# Patient Record
Sex: FEMALE | Race: OTHER | HISPANIC OR LATINO | Employment: FULL TIME | URBAN - METROPOLITAN AREA
[De-identification: names, ages, dates, MRNs, and addresses within clinical notes are randomized per-mention and may not be internally consistent; named-entity substitution may affect disease eponyms.]

---

## 2023-10-05 ENCOUNTER — APPOINTMENT (EMERGENCY)
Dept: RADIOLOGY | Facility: HOSPITAL | Age: 18
End: 2023-10-05

## 2023-10-05 ENCOUNTER — HOSPITAL ENCOUNTER (EMERGENCY)
Facility: HOSPITAL | Age: 18
Discharge: HOME | End: 2023-10-06
Attending: EMERGENCY MEDICINE

## 2023-10-05 VITALS
HEART RATE: 94 BPM | DIASTOLIC BLOOD PRESSURE: 84 MMHG | OXYGEN SATURATION: 98 % | TEMPERATURE: 98.6 F | SYSTOLIC BLOOD PRESSURE: 156 MMHG | WEIGHT: 198.85 LBS | RESPIRATION RATE: 18 BRPM

## 2023-10-05 DIAGNOSIS — S51.811A LACERATION OF RIGHT FOREARM, INITIAL ENCOUNTER: ICD-10-CM
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
## 2023-10-05 LAB
EXT PREGNANCY TEST URINE: NEGATIVE
EXT. CONTROL: NORMAL

## 2023-10-05 PROCEDURE — 99284 EMERGENCY DEPT VISIT MOD MDM: CPT

## 2023-10-05 PROCEDURE — 99283 EMERGENCY DEPT VISIT LOW MDM: CPT

## 2023-10-05 PROCEDURE — 73090 X-RAY EXAM OF FOREARM: CPT

## 2023-10-05 PROCEDURE — 81025 URINE PREGNANCY TEST: CPT

## 2023-10-05 PROCEDURE — 96372 THER/PROPH/DIAG INJ SC/IM: CPT

## 2023-10-05 PROCEDURE — 73630 X-RAY EXAM OF FOOT: CPT

## 2023-10-05 PROCEDURE — 12002 RPR S/N/AX/GEN/TRNK2.6-7.5CM: CPT

## 2023-10-05 RX ORDER — METRONIDAZOLE 500 MG/1
500 TABLET ORAL ONCE
Status: COMPLETED | OUTPATIENT
Start: 2023-10-05 | End: 2023-10-06

## 2023-10-05 RX ORDER — KETOROLAC TROMETHAMINE 30 MG/ML
15 INJECTION, SOLUTION INTRAMUSCULAR; INTRAVENOUS ONCE
Status: COMPLETED | OUTPATIENT
Start: 2023-10-05 | End: 2023-10-05

## 2023-10-05 RX ORDER — CEFUROXIME AXETIL 250 MG/1
500 TABLET ORAL EVERY 12 HOURS SCHEDULED
Status: DISCONTINUED | OUTPATIENT
Start: 2023-10-05 | End: 2023-10-06 | Stop reason: HOSPADM

## 2023-10-05 RX ORDER — ACETAMINOPHEN 325 MG/1
975 TABLET ORAL ONCE
Status: DISCONTINUED | OUTPATIENT
Start: 2023-10-05 | End: 2023-10-06 | Stop reason: HOSPADM

## 2023-10-05 RX ORDER — METRONIDAZOLE 500 MG/1
500 TABLET ORAL EVERY 8 HOURS SCHEDULED
Qty: 30 TABLET | Refills: 0 | Status: SHIPPED | OUTPATIENT
Start: 2023-10-05 | End: 2023-10-15

## 2023-10-05 RX ORDER — LIDOCAINE HYDROCHLORIDE 10 MG/ML
20 INJECTION, SOLUTION EPIDURAL; INFILTRATION; INTRACAUDAL; PERINEURAL ONCE
Status: COMPLETED | OUTPATIENT
Start: 2023-10-05 | End: 2023-10-05

## 2023-10-05 RX ORDER — CEFUROXIME AXETIL 500 MG/1
500 TABLET ORAL EVERY 12 HOURS SCHEDULED
Qty: 20 TABLET | Refills: 0 | Status: SHIPPED | OUTPATIENT
Start: 2023-10-05 | End: 2023-10-15

## 2023-10-05 RX ADMIN — KETOROLAC TROMETHAMINE 15 MG: 30 INJECTION, SOLUTION INTRAMUSCULAR; INTRAVENOUS at 22:17

## 2023-10-05 RX ADMIN — LIDOCAINE HYDROCHLORIDE 20 ML: 10 INJECTION, SOLUTION EPIDURAL; INFILTRATION; INTRACAUDAL at 22:19

## 2023-10-05 NOTE — Clinical Note
Germania Edmondson was seen and treated in our emergency department on 10/5/2023. Diagnosis:     Carroll Barahona  may return to work on return date. She may return on this date: 10/10/2023         If you have any questions or concerns, please don't hesitate to call.       Kathy Nance PA-C    ______________________________           _______________          _______________  Hospital Representative                              Date                                Time

## 2023-10-06 RX ADMIN — METRONIDAZOLE 500 MG: 500 TABLET ORAL at 00:01

## 2023-10-06 RX ADMIN — CEFUROXIME AXETIL 500 MG: 250 TABLET, FILM COATED ORAL at 00:01

## 2023-10-06 NOTE — DISCHARGE INSTRUCTIONS
Please return to the emergency department for any concerns as outlined in the after visit summary or for any other concerns. Please use the contact number provided for assistance in finding a primary care provider, follow-up as soon as reasonably possible. Sutures need to come out in 7-10 days. Continue to keep the wounds clean and dry. Change the dressings 2 times a day and also dressing becomes dirty then change more often. Yellow material gets thrown away after the first bandage change. Complete the full course of antibiotics as prescribed. Can use bacitracin, Neosporin or similar over-the-counter medications over the wounds until they are healed. Continue ibuprofen or acetaminophen as needed for pain control.

## 2023-10-06 NOTE — ED PROVIDER NOTES
History  Chief Complaint   Patient presents with   • Dog Bite     Pt reports she was feeding her dog and it bit her. Wounds noted to L foot and R arm. States her dog is vaccinated. Unsure of tetanus status. 53-year-old female with no reported past medical history presented to the ED with a complaint of a dog bite to her right forearm and left foot. Patient reports her 3year-old dog was eating something, patient went to grab the food and the dog bit her. Reports the dog bit her once in the right forearm and once in the left foot. Reports it was mild bleeding initially however no bleeding at this time. Says she does have pain but no pain medications PTA. Reports prior to this incident she is otherwise been feeling well. No other concerns of injury or trauma. Reports dog is up-to-date on his vaccines including rabies. Patient certain she received a tetanus vaccine within the last 5 years. No numbness, tingling or weakness in the RUE or left foot. No other complaints today. Does report a remote history of penicillin allergy which she believes she had difficult time breathing when she took it. No other antibiotic allergies or recent antibiotics. None       History reviewed. No pertinent past medical history. History reviewed. No pertinent surgical history. History reviewed. No pertinent family history. I have reviewed and agree with the history as documented. E-Cigarette/Vaping     E-Cigarette/Vaping Substances     Social History     Tobacco Use   • Smoking status: Never   • Smokeless tobacco: Never   Substance Use Topics   • Alcohol use: Not Currently   • Drug use: Never       Review of Systems   Skin: Positive for wound (Right forearm and left foot). All other systems reviewed and are negative. Physical Exam  Physical Exam  Vitals and nursing note reviewed. Constitutional:       General: She is not in acute distress. Appearance: She is well-developed.    HENT: Head: Normocephalic and atraumatic. Eyes:      Conjunctiva/sclera: Conjunctivae normal.   Cardiovascular:      Rate and Rhythm: Normal rate and regular rhythm. Heart sounds: No murmur heard. Pulmonary:      Effort: Pulmonary effort is normal. No respiratory distress. Breath sounds: Normal breath sounds. Abdominal:      Palpations: Abdomen is soft. Tenderness: There is no abdominal tenderness. Musculoskeletal:         General: No swelling. Cervical back: Neck supple. Comments: Multiple small puncture wounds over the dorsal aspect of the mid right forearm. Also has a few mild abrasions over the right forearm. There is also an approximate 3 cm, gaping wound over the volar aspect of the right forearm. No exposed bone, muscle, tendons, ligaments or vasculature. No active bleeding. No other TTP or signs of injury over the R UE. Full, PROM intact in the right shoulder, right elbow, right wrist and right digits. 2+ right radial pulse. Sensation and motor intact in the RUE. Strength 5/5 in RUE. Patient also has 2 small puncture wounds over the plantar aspect of the left foot. Nonbleeding. No lacerations. No bony TTP. Skin:     General: Skin is warm and dry. Capillary Refill: Capillary refill takes less than 2 seconds. Neurological:      Mental Status: She is alert.    Psychiatric:         Mood and Affect: Mood normal.                       Vital Signs  ED Triage Vitals [10/05/23 2017]   Temperature Pulse Respirations Blood Pressure SpO2   98.6 °F (37 °C) 94 18 156/84 98 %      Temp Source Heart Rate Source Patient Position - Orthostatic VS BP Location FiO2 (%)   Oral -- -- -- --      Pain Score       10 - Worst Possible Pain           Vitals:    10/05/23 2017   BP: 156/84   Pulse: 94         Visual Acuity      ED Medications  Medications   acetaminophen (TYLENOL) tablet 975 mg (975 mg Oral Not Given 10/5/23 2217)   cefuroxime (CEFTIN) tablet 500 mg (500 mg Oral Given 10/6/23 0001)   lidocaine (PF) (XYLOCAINE-MPF) 1 % injection 20 mL (20 mL Infiltration Given by Other 10/5/23 2219)   ketorolac (TORADOL) injection 15 mg (15 mg Intramuscular Given 10/5/23 2217)   metroNIDAZOLE (FLAGYL) tablet 500 mg (500 mg Oral Given 10/6/23 0001)       Diagnostic Studies  Results Reviewed     Procedure Component Value Units Date/Time    POCT pregnancy, urine [[id number]]  (Normal) Resulted: 10/05/23 2236    Lab Status: Final result Updated: 10/05/23 2236     EXT Preg Test, Ur Negative     Control Valid                 XR foot 3+ views LEFT   ED Interpretation by Nima Cotter PA-C (10/05 2230)   ED wet read:  No radiopaque retained foreign body or acute osseous abnormality appreciated. XR forearm 2 views RIGHT   ED Interpretation by Nima Cotter PA-C (10/05 2230)   ED wet read:  No radiopaque retained foreign body or acute osseous abnormality appreciated. Soft tissue findings consistent with puncture wounds/lacerations. Procedures  Universal Protocol:  Consent: Verbal consent obtained. Risks and benefits: risks, benefits and alternatives were discussed  Consent given by: patient  Patient understanding: patient states understanding of the procedure being performed    Laceration repair    Date/Time: 10/5/2023 11:29 PM    Performed by: Nima Cotter PA-C  Authorized by: Nima Cotter PA-C  Body area: upper extremity  Location details: right lower arm  Laceration length: 3 cm  Foreign bodies: no foreign bodies  Tendon involvement: none  Nerve involvement: none  Vascular damage: no  Anesthesia: local infiltration    Anesthesia:  Local Anesthetic: lidocaine 1% without epinephrine  Anesthetic total: 5 mL      Procedure Details:  Preparation: Patient was prepped and draped in the usual sterile fashion.   Irrigation solution: saline  Irrigation method: syringe  Amount of cleaning: extensive  Debridement: none  Skin closure: 4-0 nylon  Number of sutures: 1  Technique: simple  Approximation: loose  Approximation difficulty: simple  Dressing: xeroform, non-stick gauze and gauze wrap. Patient tolerance: patient tolerated the procedure well with no immediate complications    Universal Protocol:  Consent: Verbal consent obtained. Risks and benefits: risks, benefits and alternatives were discussed  Consent given by: patient  Patient understanding: patient states understanding of the procedure being performed    Laceration repair    Date/Time: 10/5/2023 11:31 PM    Performed by: Barrett Graves PA-C  Authorized by: Barrett Graves PA-C  Body area: upper extremity  Location details: right lower arm  Wound length (cm): Multiple puncture wounds   Foreign bodies: no foreign bodies  Tendon involvement: none  Nerve involvement: none  Vascular damage: no  Anesthesia: local infiltration    Anesthesia:  Local Anesthetic: lidocaine 1% without epinephrine  Anesthetic total: 3 mL    Sedation:  Patient sedated: no        Procedure Details:  Preparation: Patient was prepped and draped in the usual sterile fashion. Irrigation solution: saline  Irrigation method: syringe  Amount of cleaning: extensive  Debridement: none  Wound skin closure material used: None   Dressing: Xeroform, non-stick gauze and gauze wrap   Patient tolerance: patient tolerated the procedure well with no immediate complications  Comments: Wounds left open in light of dog bite. Nongaping. Hemostasis achieved. Universal Protocol:  Consent: Verbal consent obtained. Risks and benefits: risks, benefits and alternatives were discussed  Consent given by: patient  Patient understanding: patient states understanding of the procedure being performed    Laceration repair    Date/Time: 10/5/2023 11:32 PM    Performed by: Barrett Graves PA-C  Authorized by: Barrett Graves PA-C  Body area: lower extremity  Location details: left foot  Wound length (cm): Puncture wound.   Foreign bodies: no foreign bodies  Tendon involvement: none  Nerve involvement: none  Vascular damage: no  Anesthesia: local infiltration    Anesthesia:  Local Anesthetic: lidocaine 1% without epinephrine  Anesthetic total: 1 mL      Procedure Details:  Preparation: Patient was prepped and draped in the usual sterile fashion. Irrigation solution: saline  Irrigation method: syringe  Amount of cleaning: extensive  Debridement: none  Dressing: Xeroform, non-stick gauze and gauze wrap   Patient tolerance: patient tolerated the procedure well with no immediate complications               ED Course  ED Course as of 10/06/23 0200   u Oct 05, 2023   2230 No radiopaque retained foreign body or acute osseous abnormality appreciated on XR imaging pending official reads. 2325 Laceration repaired without issue, loosely approximated in light of wound gaping. Hemostasis achieved. All other wounds thoroughly irrigated. Xeroform and nonstick gauze applied with gauze wrap. Patient given 1 dose of antibiotics in the ED. Prescription sent to her pharmacy. Verbally discussed increased risk of infection in light of dog bite and wounds. Follow-up and supportive care as outlined in the AVS.  Patient reports she feels well to go home. Strict return precautions verbally communicated to the patient as outlined in the AVS.  All patient questions and concerns were answered. Patient verbally communicated their understanding and agreement to the above plan. Patient stable at discharge. Portions of the record may have been created with voice recognition software. Occasional wrong word or "sound a like" substitutions may have occurred due to the inherent limitations of voice recognition software. Read the chart carefully and recognize, using context, where substitutions have occurred.                                                Medical Decision Making  78-year-old female presents ED with concern for a dog bite from her own pet shortly prior to arrival.  Has multiple wounds over the right form and left foot. Has otherwise been well. Dog is up-to-date on his vaccines. Patient is up-to-date on her tetanus. Does report a history of penicillin allergy. PE findings as outlined in the PE section. Laceration over the right forearm will likely require loose approximation as it is gaping and fairly large. Other wounds we will plan to thoroughly irrigate. We will start patient on antibiotics. Will defer Augmentin as patient with penicillin allergy. Will prescribe cefuroxime and metronidazole. Will obtain XR imaging of the right forearm and left foot to ensure no signs of radiopaque retained foreign body or osseous abnormality. No other complaints from pt or findings on PE to warrant further labs, imaging or work-up at this time. Patient agreeable to this plan. Likely discharge however will reevaluate after above-stated plan. Dog bite of multiple sites: acute illness or injury  Laceration of right forearm, initial encounter: acute illness or injury  Amount and/or Complexity of Data Reviewed  Labs: ordered. Radiology: ordered and independent interpretation performed. Risk  OTC drugs. Prescription drug management. Disposition  Final diagnoses:   Dog bite of multiple sites   Laceration of right forearm, initial encounter     Time reflects when diagnosis was documented in both MDM as applicable and the Disposition within this note     Time User Action Codes Description Comment    10/5/2023 11:22 PM Amanda Falcon Add Brette.Perish. 0XXA] Dog bite of multiple sites     10/5/2023 11:23 PM Amanda Falcon Add [L95.560T] Laceration of right forearm, initial encounter       ED Disposition     ED Disposition   Discharge    Condition   Stable    Date/Time   Thu Oct 5, 2023 11:22 PM    Comment   Jung Ayon discharge to home/self care.                Follow-up Information     Follow up With Specialties Details Why Contact Info Additional [facility] Emergency Department Emergency Medicine Go to  As needed, If symptoms worsen [address] [phone]  [address] [facility] Emergency Department, [address] [facility] [place], [address], [address]  Call in 1 day For a primary care provider, For further evaluation [phone]             Discharge Medication List as of 10/5/2023 11:29 PM      START taking these medications    Details   cefuroxime (CEFTIN) 500 mg tablet Take 1 tablet (500 mg total) by mouth every 12 (twelve) hours for 10 days, Starting Thu 10/5/2023, Until Sun 10/15/2023, Normal      metroNIDAZOLE (FLAGYL) 500 mg tablet Take 1 tablet (500 mg total) by mouth every 8 (eight) hours for 10 days, Starting Thu 10/5/2023, Until Sun 10/15/2023, Normal             No discharge procedures on file.     PDMP Review     None          ED Provider  Electronically Signed by           Justine Mathew PA-C  10/06/23 6625

## 2025-07-10 ENCOUNTER — ULTRASOUND (OUTPATIENT)
Dept: OBGYN CLINIC | Facility: CLINIC | Age: 20
End: 2025-07-10

## 2025-07-10 VITALS
WEIGHT: 162.6 LBS | SYSTOLIC BLOOD PRESSURE: 132 MMHG | HEIGHT: 65 IN | DIASTOLIC BLOOD PRESSURE: 82 MMHG | BODY MASS INDEX: 27.09 KG/M2

## 2025-07-10 DIAGNOSIS — Z34.01 PRENATAL CARE, FIRST PREGNANCY IN FIRST TRIMESTER: ICD-10-CM

## 2025-07-10 DIAGNOSIS — Z32.01 ENCOUNTER FOR PREGNANCY TEST, RESULT POSITIVE: Primary | ICD-10-CM

## 2025-07-10 DIAGNOSIS — Z3A.08 8 WEEKS GESTATION OF PREGNANCY: ICD-10-CM

## 2025-07-10 LAB — SL AMB POCT URINE HCG: POSITIVE

## 2025-07-10 PROCEDURE — 81025 URINE PREGNANCY TEST: CPT | Performed by: OBSTETRICS & GYNECOLOGY

## 2025-07-10 PROCEDURE — 76801 OB US < 14 WKS SINGLE FETUS: CPT | Performed by: OBSTETRICS & GYNECOLOGY

## 2025-07-10 PROCEDURE — 99203 OFFICE O/P NEW LOW 30 MIN: CPT | Performed by: OBSTETRICS & GYNECOLOGY

## 2025-07-10 NOTE — PROGRESS NOTES
"Name: Meet Marquez      : 2005      MRN: [id number]  Encounter Provider: Daniel Villela MD  Encounter Date: 7/10/2025   Encounter department: [facility]  :  Assessment & Plan  Encounter for pregnancy test, result positive  Pregnancy confirmed with POCT hgb, and viable IUP noted  Orders:    POCT urine HCG    AMB US OB < 14 weeks single or first gestation level 1    8 weeks gestation of pregnancy    Orders:    HIV 1/2 AG/AB w Reflex [facility] for 2 yr old and above; Future    Hepatitis C antibody; Future    UA (URINE) with reflex to Scope; Future    Urine culture; Future    Hepatitis B surface antigen; Future    CBC and differential; Future    Rubella antibody, IgG; Future    Type and screen; Future    RPR-Syphilis Screening (Total Syphilis IGG/IGM); Future    Hepatitis B surface antibody; Future    Anemia Panel w/Reflex, OB; Future    Prenatal care, first pregnancy in first trimester    Orders:    HIV 1/2 AG/AB w Reflex [facility] for 2 yr old and above; Future    Hepatitis C antibody; Future    UA (URINE) with reflex to Scope; Future    Urine culture; Future    Hepatitis B surface antigen; Future    CBC and differential; Future    Rubella antibody, IgG; Future    Type and screen; Future    RPR-Syphilis Screening (Total Syphilis IGG/IGM); Future    Hepatitis B surface antibody; Future    Anemia Panel w/Reflex, OB; Future        History of Present Illness   HPI  S: 19 y.o.  who presents for viability scan. Her LMP was 5/15/2025 and she is 8 weeks and 0 days by her LMP. This pregnancy was unplanned, but desirable. She reports mild and infrequent cramping. She reports having nausea, but no vomiting. Medical problems include: none. Current medications: none. Smoking/drinking/illicit drug use: denies.     Review of Systems  See HPI     Objective   /82 (BP Location: Left arm, Patient Position: Sitting)   Ht 5' 4.72\" (1.644 m)   Wt 73.8 kg (162 lb 9.6 oz)   LMP " 05/15/2025 (Exact Date)   BMI 27.29 kg/m²      Physical Exam  See media tab for U/S imaging                  Daniel Villela MD  Obstetrics & Gynecology PGY-2  7/10/2025  3:06 PM

## 2025-07-10 NOTE — PROGRESS NOTES
Ultrasound Probe Disinfection    A transvaginal ultrasound was performed.   Prior to use, disinfection was performed with High Level Disinfection Process ([employer])  Probe serial number [id number] was used.    Shanda Hart MA  07/10/25  2:54 PM

## 2025-07-10 NOTE — ASSESSMENT & PLAN NOTE
Pregnancy confirmed with POCT hgb, and viable IUP noted  Orders:    POCT urine HCG    AMB US OB < 14 weeks single or first gestation level 1

## 2025-07-10 NOTE — ASSESSMENT & PLAN NOTE
Orders:    HIV 1/2 AG/AB w Reflex [facility] for 2 yr old and above; Future    Hepatitis C antibody; Future    UA (URINE) with reflex to Scope; Future    Urine culture; Future    Hepatitis B surface antigen; Future    CBC and differential; Future    Rubella antibody, IgG; Future    Type and screen; Future    RPR-Syphilis Screening (Total Syphilis IGG/IGM); Future    Hepatitis B surface antibody; Future    Anemia Panel w/Reflex, OB; Future

## 2025-07-21 ENCOUNTER — APPOINTMENT (OUTPATIENT)
Dept: LAB | Facility: HOSPITAL | Age: 20
End: 2025-07-21
Payer: MEDICARE

## 2025-07-21 DIAGNOSIS — Z3A.08 8 WEEKS GESTATION OF PREGNANCY: ICD-10-CM

## 2025-07-21 DIAGNOSIS — Z34.01 PRENATAL CARE, FIRST PREGNANCY IN FIRST TRIMESTER: ICD-10-CM

## 2025-07-21 LAB
ABO GROUP BLD: NORMAL
BACTERIA UR QL AUTO: ABNORMAL /HPF
BASOPHILS # BLD AUTO: 0.07 THOUSANDS/ÂΜL (ref 0–0.1)
BASOPHILS NFR BLD AUTO: 0 % (ref 0–1)
BILIRUB UR QL STRIP: NEGATIVE
BLD GP AB SCN SERPL QL: NEGATIVE
CAOX CRY URNS QL MICRO: ABNORMAL /HPF
CLARITY UR: ABNORMAL
COLOR UR: YELLOW
EOSINOPHIL # BLD AUTO: 0.06 THOUSAND/ÂΜL (ref 0–0.61)
EOSINOPHIL NFR BLD AUTO: 0 % (ref 0–6)
ERYTHROCYTE [DISTWIDTH] IN BLOOD BY AUTOMATED COUNT: 14.6 % (ref 11.6–15.1)
FERRITIN SERPL-MCNC: 24 NG/ML (ref 30–307)
GLUCOSE UR STRIP-MCNC: NEGATIVE MG/DL
HCT VFR BLD AUTO: 40.7 % (ref 34.8–46.1)
HGB BLD-MCNC: 13.2 G/DL (ref 11.5–15.4)
HGB UR QL STRIP.AUTO: NEGATIVE
IMM GRANULOCYTES # BLD AUTO: 0.09 THOUSAND/UL (ref 0–0.2)
IMM GRANULOCYTES NFR BLD AUTO: 1 % (ref 0–2)
KETONES UR STRIP-MCNC: NEGATIVE MG/DL
LEUKOCYTE ESTERASE UR QL STRIP: NEGATIVE
LYMPHOCYTES # BLD AUTO: 2.28 THOUSANDS/ÂΜL (ref 0.6–4.47)
LYMPHOCYTES NFR BLD AUTO: 14 % (ref 14–44)
MCH RBC QN AUTO: 24.7 PG (ref 26.8–34.3)
MCHC RBC AUTO-ENTMCNC: 32.4 G/DL (ref 31.4–37.4)
MCV RBC AUTO: 76 FL (ref 82–98)
MONOCYTES # BLD AUTO: 1.24 THOUSAND/ÂΜL (ref 0.17–1.22)
MONOCYTES NFR BLD AUTO: 8 % (ref 4–12)
MUCOUS THREADS UR QL AUTO: ABNORMAL
NEUTROPHILS # BLD AUTO: 12.26 THOUSANDS/ÂΜL (ref 1.85–7.62)
NEUTS SEG NFR BLD AUTO: 77 % (ref 43–75)
NITRITE UR QL STRIP: NEGATIVE
NON-SQ EPI CELLS URNS QL MICRO: ABNORMAL /HPF
NRBC BLD AUTO-RTO: 0 /100 WBCS
PH UR STRIP.AUTO: 6 [PH]
PLATELET # BLD AUTO: 272 THOUSANDS/UL (ref 149–390)
PMV BLD AUTO: 9.9 FL (ref 8.9–12.7)
PROT UR STRIP-MCNC: ABNORMAL MG/DL
RBC # BLD AUTO: 5.34 MILLION/UL (ref 3.81–5.12)
RBC #/AREA URNS AUTO: ABNORMAL /HPF
RH BLD: POSITIVE
RUBV IGG SERPL IA-ACNC: 101.6 IU/ML
SP GR UR STRIP.AUTO: 1.02 (ref 1–1.04)
SPECIMEN EXPIRATION DATE: NORMAL
UROBILINOGEN UA: NEGATIVE MG/DL
WBC # BLD AUTO: 16 THOUSAND/UL (ref 4.31–10.16)
WBC #/AREA URNS AUTO: ABNORMAL /HPF

## 2025-07-21 PROCEDURE — 86803 HEPATITIS C AB TEST: CPT

## 2025-07-21 PROCEDURE — 87086 URINE CULTURE/COLONY COUNT: CPT

## 2025-07-21 PROCEDURE — 87340 HEPATITIS B SURFACE AG IA: CPT

## 2025-07-21 PROCEDURE — 86780 TREPONEMA PALLIDUM: CPT

## 2025-07-21 PROCEDURE — 86900 BLOOD TYPING SEROLOGIC ABO: CPT

## 2025-07-21 PROCEDURE — 86706 HEP B SURFACE ANTIBODY: CPT

## 2025-07-21 PROCEDURE — 87389 HIV-1 AG W/HIV-1&-2 AB AG IA: CPT

## 2025-07-21 PROCEDURE — 81001 URINALYSIS AUTO W/SCOPE: CPT

## 2025-07-21 PROCEDURE — 87147 CULTURE TYPE IMMUNOLOGIC: CPT

## 2025-07-21 PROCEDURE — 86762 RUBELLA ANTIBODY: CPT

## 2025-07-21 PROCEDURE — 86901 BLOOD TYPING SEROLOGIC RH(D): CPT

## 2025-07-21 PROCEDURE — 85025 COMPLETE CBC W/AUTO DIFF WBC: CPT

## 2025-07-21 PROCEDURE — 86850 RBC ANTIBODY SCREEN: CPT

## 2025-07-21 PROCEDURE — 36415 COLL VENOUS BLD VENIPUNCTURE: CPT

## 2025-07-21 PROCEDURE — 82728 ASSAY OF FERRITIN: CPT

## 2025-07-22 LAB
HBV SURFACE AB SER-ACNC: 7.16 MIU/ML
HBV SURFACE AG SER QL: NORMAL
HCV AB SER QL: NORMAL
HIV 1+2 AB+HIV1 P24 AG SERPL QL IA: NORMAL
TREPONEMA PALLIDUM IGG+IGM AB [PRESENCE] IN SERUM OR PLASMA BY IMMUNOASSAY: NORMAL

## 2025-07-23 LAB
BACTERIA UR CULT: ABNORMAL
BACTERIA UR CULT: ABNORMAL

## 2025-07-28 ENCOUNTER — INITIAL PRENATAL (OUTPATIENT)
Dept: OBGYN CLINIC | Facility: CLINIC | Age: 20
End: 2025-07-28

## 2025-07-28 VITALS
WEIGHT: 163.4 LBS | HEART RATE: 81 BPM | SYSTOLIC BLOOD PRESSURE: 134 MMHG | OXYGEN SATURATION: 100 % | HEIGHT: 65 IN | DIASTOLIC BLOOD PRESSURE: 64 MMHG | BODY MASS INDEX: 27.22 KG/M2

## 2025-07-28 DIAGNOSIS — Z3A.10 10 WEEKS GESTATION OF PREGNANCY: Primary | ICD-10-CM

## 2025-07-28 DIAGNOSIS — Z31.430 ENCOUNTER OF FEMALE FOR TESTING FOR GENETIC DISEASE CARRIER STATUS FOR PROCREATIVE MANAGEMENT: ICD-10-CM

## 2025-07-28 PROCEDURE — T1001 NURSING ASSESSMENT/EVALUATN: HCPCS

## 2025-07-30 ENCOUNTER — PATIENT OUTREACH (OUTPATIENT)
Dept: OBGYN CLINIC | Facility: CLINIC | Age: 20
End: 2025-07-30

## 2025-08-01 ENCOUNTER — PATIENT OUTREACH (OUTPATIENT)
Dept: OBGYN CLINIC | Facility: CLINIC | Age: 20
End: 2025-08-01

## 2025-08-05 ENCOUNTER — PATIENT OUTREACH (OUTPATIENT)
Dept: OBGYN CLINIC | Facility: CLINIC | Age: 20
End: 2025-08-05

## 2025-08-07 ENCOUNTER — HOSPITAL ENCOUNTER (EMERGENCY)
Facility: HOSPITAL | Age: 20
Discharge: HOME | End: 2025-08-07
Attending: EMERGENCY MEDICINE
Payer: MEDICARE

## 2025-08-07 ENCOUNTER — TELEPHONE (OUTPATIENT)
Dept: OBGYN CLINIC | Facility: CLINIC | Age: 20
End: 2025-08-07

## 2025-08-08 PROBLEM — Z3A.12 12 WEEKS GESTATION OF PREGNANCY: Status: ACTIVE | Noted: 2025-07-10

## 2025-08-08 PROBLEM — Z36.82 ENCOUNTER FOR (NT) NUCHAL TRANSLUCENCY SCAN: Status: ACTIVE | Noted: 2025-08-08

## 2025-08-12 ENCOUNTER — INITIAL PRENATAL (OUTPATIENT)
Dept: OBGYN CLINIC | Facility: CLINIC | Age: 20
End: 2025-08-12

## 2025-08-12 PROBLEM — I10 CHRONIC HYPERTENSION: Status: ACTIVE | Noted: 2025-08-12

## 2025-08-13 ENCOUNTER — ANCILLARY PROCEDURE (OUTPATIENT)
Age: 20
End: 2025-08-13
Attending: OBSTETRICS & GYNECOLOGY
Payer: MEDICARE

## 2025-08-13 ENCOUNTER — ROUTINE PRENATAL (OUTPATIENT)
Age: 20
End: 2025-08-13
Attending: OBSTETRICS & GYNECOLOGY
Payer: MEDICARE

## 2025-08-13 DIAGNOSIS — Z3A.12 12 WEEKS GESTATION OF PREGNANCY: ICD-10-CM

## 2025-08-13 PROCEDURE — 76813 OB US NUCHAL MEAS 1 GEST: CPT | Performed by: OBSTETRICS & GYNECOLOGY

## 2025-08-13 PROCEDURE — NC001 PR NO CHARGE: Performed by: OBSTETRICS & GYNECOLOGY

## 2025-08-14 ENCOUNTER — APPOINTMENT (OUTPATIENT)
Dept: LAB | Facility: HOSPITAL | Age: 20
End: 2025-08-14
Payer: MEDICARE

## 2025-08-14 ENCOUNTER — ROUTINE PRENATAL (OUTPATIENT)
Dept: OBGYN CLINIC | Facility: CLINIC | Age: 20
End: 2025-08-14

## 2025-08-14 PROBLEM — Z3A.13 13 WEEKS GESTATION OF PREGNANCY: Status: ACTIVE | Noted: 2025-07-10

## 2025-08-14 PROBLEM — Z36.82 ENCOUNTER FOR (NT) NUCHAL TRANSLUCENCY SCAN: Status: RESOLVED | Noted: 2025-08-08 | Resolved: 2025-08-14

## 2025-08-21 ENCOUNTER — OFFICE VISIT (OUTPATIENT)
Age: 20
End: 2025-08-21
Attending: OBSTETRICS & GYNECOLOGY

## 2025-08-21 DIAGNOSIS — Z3A.14 14 WEEKS GESTATION OF PREGNANCY: ICD-10-CM

## 2025-08-21 DIAGNOSIS — Z36.0 ENCOUNTER FOR ANTENATAL SCREENING FOR CHROMOSOMAL ANOMALIES: Primary | ICD-10-CM

## 2025-08-25 PROBLEM — Z3A.14 14 WEEKS GESTATION OF PREGNANCY: Status: ACTIVE | Noted: 2025-07-10

## 2025-08-27 LAB
CFDNA.FET/CFDNA.TOTAL SFR FETUS: NORMAL %
CFDNA.FET/CFDNA.TOTAL SFR FETUS: NORMAL %
CITATION REF LAB TEST: NORMAL
FET 13+18+21+X+Y ANEUP PLAS.CFDNA: NEGATIVE
FET CHR 21 TS PLAS.CFDNA QL: NEGATIVE
FET CHR 21 TS PLAS.CFDNA QL: NEGATIVE
FET MS X RISK WBC.DNA+CFDNA QL: NOT DETECTED
FET SEX PLAS.CFDNA DOSAGE CFDNA: NORMAL
FET TS 13 RISK PLAS.CFDNA QL: NEGATIVE
FET X + Y ANEUP RISK PLAS.CFDNA SEQ-IMP: NOT DETECTED
GA EST FROM CONCEPTION DATE: NORMAL D
GESTATIONAL AGE > 9:: YES
LAB DIRECTOR NAME PROVIDER: NORMAL
LABORATORY COMMENT REPORT: NORMAL
LIMITATIONS OF THE TEST: NORMAL
NEGATIVE PREDICTIVE VALUE: NORMAL
PERFORMANCE CHARACTERISTICS: NORMAL
POSITIVE PREDICTIVE VALUE: NORMAL
REF LAB TEST METHOD: NORMAL
SL AMB NOTE:: NORMAL
TEST PERFORMANCE INFO SPEC: NORMAL